# Patient Record
Sex: MALE | Race: WHITE | NOT HISPANIC OR LATINO | Employment: FULL TIME | URBAN - METROPOLITAN AREA
[De-identification: names, ages, dates, MRNs, and addresses within clinical notes are randomized per-mention and may not be internally consistent; named-entity substitution may affect disease eponyms.]

---

## 2023-10-30 ENCOUNTER — OFFICE VISIT (OUTPATIENT)
Dept: OTOLARYNGOLOGY | Facility: CLINIC | Age: 28
End: 2023-10-30
Payer: COMMERCIAL

## 2023-10-30 VITALS
DIASTOLIC BLOOD PRESSURE: 64 MMHG | HEIGHT: 63 IN | HEART RATE: 80 BPM | SYSTOLIC BLOOD PRESSURE: 110 MMHG | RESPIRATION RATE: 16 BRPM | TEMPERATURE: 98.2 F | WEIGHT: 115 LBS | BODY MASS INDEX: 20.38 KG/M2

## 2023-10-30 DIAGNOSIS — R09.81 NASAL CONGESTION: ICD-10-CM

## 2023-10-30 DIAGNOSIS — J35.8 TONSIL STONE: ICD-10-CM

## 2023-10-30 DIAGNOSIS — G47.30 SLEEP DISORDER BREATHING: ICD-10-CM

## 2023-10-30 DIAGNOSIS — J34.89 NASAL DRAINAGE: ICD-10-CM

## 2023-10-30 DIAGNOSIS — J35.01 CHRONIC TONSILLITIS: ICD-10-CM

## 2023-10-30 DIAGNOSIS — J34.89 NASAL OBSTRUCTION: ICD-10-CM

## 2023-10-30 DIAGNOSIS — J35.1 TONSILLAR HYPERTROPHY: ICD-10-CM

## 2023-10-30 DIAGNOSIS — R44.8 FACIAL PRESSURE: ICD-10-CM

## 2023-10-30 DIAGNOSIS — J31.0 CHRONIC RHINITIS: ICD-10-CM

## 2023-10-30 DIAGNOSIS — J34.3 HYPERTROPHY OF BOTH INFERIOR NASAL TURBINATES: ICD-10-CM

## 2023-10-30 DIAGNOSIS — J34.2 DEVIATED NASAL SEPTUM: ICD-10-CM

## 2023-10-30 DIAGNOSIS — J35.8 ASYMMETRIC TONSILS: Primary | ICD-10-CM

## 2023-10-30 PROBLEM — R45.851 SUICIDAL IDEATION: Status: ACTIVE | Noted: 2022-12-08

## 2023-10-30 PROCEDURE — 1036F TOBACCO NON-USER: CPT | Performed by: STUDENT IN AN ORGANIZED HEALTH CARE EDUCATION/TRAINING PROGRAM

## 2023-10-30 PROCEDURE — 99204 OFFICE O/P NEW MOD 45 MIN: CPT | Performed by: STUDENT IN AN ORGANIZED HEALTH CARE EDUCATION/TRAINING PROGRAM

## 2023-10-30 PROCEDURE — 31231 NASAL ENDOSCOPY DX: CPT | Performed by: STUDENT IN AN ORGANIZED HEALTH CARE EDUCATION/TRAINING PROGRAM

## 2023-10-30 RX ORDER — SOD CHLOR,BICARB/SQUEEZ BOTTLE
PACKET, WITH RINSE DEVICE NASAL
Qty: 1 EACH | Refills: 3 | Status: SHIPPED | OUTPATIENT
Start: 2023-10-30

## 2023-10-30 RX ORDER — ESCITALOPRAM OXALATE 10 MG/1
10 TABLET ORAL EVERY MORNING
COMMUNITY
Start: 2023-02-03 | End: 2023-11-22 | Stop reason: WASHOUT

## 2023-10-30 RX ORDER — FLUTICASONE PROPIONATE 50 MCG
2 SPRAY, SUSPENSION (ML) NASAL DAILY
Qty: 48 G | Refills: 3 | Status: SHIPPED | OUTPATIENT
Start: 2023-10-30 | End: 2024-10-29

## 2023-10-30 RX ORDER — CITALOPRAM 10 MG/1
20 TABLET ORAL EVERY MORNING
COMMUNITY
Start: 2023-09-02 | End: 2023-11-22 | Stop reason: WASHOUT

## 2023-10-30 RX ORDER — ALBUTEROL SULFATE 90 UG/1
1 AEROSOL, METERED RESPIRATORY (INHALATION)
COMMUNITY
Start: 2009-10-01

## 2023-10-30 NOTE — PROGRESS NOTES
HPI  Vlad Hollingsworth is a 27 y.o. male presenting for initial evaluation of multiple ENT complaints.  The patient reports developing an episode of chronic tonsillitis lasting approximately 6 months, this has now improved, nevertheless the patient endorses tonsillar asymmetry and recurrent episodes of tonsil stones.  Endorses snoring, poor night sleep and apneic events.   No known coagulopathies    In addition the patient reports multiple sinonasal issues  Main Symptoms: Daily sinonasal symptoms include bilateral nasal congestion/obstruction, anterior and posterior nasal drainage.  Endorses occasional facial pressure along the maxillary regions bilaterally.  Duration: Years  Laterality: Bilateral    Patient denies facial pain, ansomia, dental pain, watery/ itchy eyes, immunotherapy.    No odynophagia/dysphagia/SOB/dyspnea/hoarseness/fevers/chills/weight loss/night sweats/neck pain/neck lumps or bumps/hemoptysis.    Current treatment:  Nasal Steroid: No  Sinus Rinse: No  Antihistamine: No  Prednisone: No  Other: None    Recent CT: None    Previous nasal/sinus surgery: None       Past Medical History:   Diagnosis Date    Depression        History reviewed. No pertinent surgical history.      Current Outpatient Medications on File Prior to Visit   Medication Sig Dispense Refill    albuterol (ProAir HFA) 90 mcg/actuation inhaler Inhale 1 puff 4 times a day.      citalopram (CeleXA) 10 mg tablet Take 2 tablets (20 mg) by mouth once daily in the morning.      escitalopram (Lexapro) 10 mg tablet Take 1 tablet (10 mg) by mouth once daily in the morning.       No current facility-administered medications on file prior to visit.        Review of Systems  A detailed 12 point ROS was performed and is negative except as noted in the intake form, HPI and/or Past Medical History     Vitals:    10/30/23 1137   BP: 110/64   Pulse: 80   Resp: 16   Temp: 36.8 °C (98.2 °F)        Physical Exam   CONSTITUTIONAL: Vitals -reviewed from  intake field, well developed, well nourished.  VOICE: Normal voice quality  RESPIRATION: Breathing comfortably, no stridor.  CV: No clubbing/cyanosis/edema in hands.  EYES: EOM Intact, sclera normal.  NEURO: Alert and oriented times 3, Cranial nerves V,VII intact and symmetric bilaterally.  HEAD AND FACE: Symmetric facial features, no masses or lesions, sinuses nontender to palpation.  SALIVARY GLANDS: Parotid and submandibular glands normal bilaterally.  EARS: Normal external ears, external auditory canals, and TMs to otoscopy  + NOSE: External nose midline, anterior rhinoscopy is normal with limited visualization to the anterior aspect of the interior turbinates. No lesions noted.  Bilateral inferior turbinate hypertrophy  Left septal deviation right inferior spur  + ORAL CAVITY/OROPHARYNX/LIPS: Normal mucous membranes, normal floor of mouth/tongue/OP, no masses or lesions are noted.  Tonsils: Right 3+, left 4+, cryptic  PHARYNGEAL WALLS AND NASOPHARYNX: No masses noted. Mucosa appears clean and moist  NECK/LYMPH: No LAD, no thyroid masses. Trachea palpably midline  SKIN: Neck skin is without injury  PSYCH: Alert and oriented with appropriate mood and affect     Nasal endoscopy:  PROCEDURE NOTE    For better visualization because of septal deviation, turbinate hypertrophy nasal endoscopy was performed after verbal consent was obtained by the patient and/or guardian. Both nostrils were sprayed with a mixture of lidocaine 4% and Afrin. After a sufficient amount of time elapsed for mucosal anesthesia to take place, the nasal endoscope was advanced into the nostril.    The following areas were visualized:  Nasal passage, nasal septum, turbinates, middle meatus, nasopharynx, sinus ostia    The patient tolerated the procedure well and these structures were found to be normal except as follows:  Bilateral inferior turbinate hypertrophy  Left septal deviation right inferior spur  No mucopurulence, polyps, nor masses seen  in inferior meati, middle meati, nor sphenoethmoidal recesses bilaterally.     PROCEDURE NOTE:  FLEXIBLE NASOLARYNGOSCOPY     The risks, benefits, and alternatives were explained.  The patient wished to proceed and provided verbal consent.       INDICATIONS: To visualize anatomy in specific detail; evaluation of symptomatic disorder involving the voice, swallowing, or upper aerodigestive tract, including RADHA.      DESCRIPTION OF PROCEDURE: After adequate afrin and lidocaine spray, I advanced the endoscope into the nasal cavity.  The nasal cavity, nasopharynx, tongue base, vallecula, posterior/lateral pharyngeal walls, pyriform, epiglottis, post cricoid area, and larynx were within normal limits.  The following specific findings should be noted:  Small/nonobstructive adenoid pads  No lesions/masses  Mobile TVCs bilaterally  No pooling secretions     The patient tolerated the procedure without difficulty.     Assessment  Tonsillar asymmetry  Chronic tonsillitis  Tonsillar hypertrophy  Sleep disordered breathing  Chronic rhinitis  Nasal airway obstruction  Nasal septal deviation  Nasal drainage  Bilateral inferior turbinate hypertrophy    Plan  - Tonsillar asymmetry/hypertrophy, chronic tonsillitis  The patient and I discussed current symptoms as well as several therapeutic options.  The patient would like to proceed with tonsillectomy.  We reviewed the procedure, risks, benefits, limitations, expectations, and  recovery. Benefits were discussed include possibility of better breathing and sleep and less infections. Risks were discussed including: a 1 in 25 chance of bleeding, a 1 in 500 chance of transfusion, a 1 in 100,000 chance of life-threatening bleeding or death, anesthesia complications, infection, changes in taste, speech or swallowing. A full history and physical examination, informed consent, preoperative teaching, planning and arrangements have been performed.  All questions were answered.    - ORESTES STEIN   The  patient and I discussed their current nasal / sinus symptoms as well as several therapeutic options. I would like to begin a nasal hygiene/ medical regimen consisting of Flonase, saline irrigations.  The patient was instructed on the correct technique to administer the topical nasal spray (s) aiming at the lateral nasal wall for maximal efficacy and to avoid irritation to the septum which could lead to epistaxis. I stressed consistency of usage for maximal benefit. We discussed the rationale for the timing of this therapy and the benefits and potential adverse effects.      RTC 6w

## 2023-11-20 ENCOUNTER — LAB (OUTPATIENT)
Dept: LAB | Facility: LAB | Age: 28
End: 2023-11-20
Payer: COMMERCIAL

## 2023-11-20 ENCOUNTER — APPOINTMENT (OUTPATIENT)
Dept: LAB | Facility: LAB | Age: 28
End: 2023-11-20
Payer: COMMERCIAL

## 2023-11-20 DIAGNOSIS — J35.8 ASYMMETRIC TONSILS: ICD-10-CM

## 2023-11-20 DIAGNOSIS — J35.1 TONSILLAR HYPERTROPHY: ICD-10-CM

## 2023-11-20 LAB
APTT PPP: 35 SECONDS (ref 27–38)
ERYTHROCYTE [DISTWIDTH] IN BLOOD BY AUTOMATED COUNT: 12 % (ref 11.5–14.5)
HCT VFR BLD AUTO: 40.7 % (ref 41–52)
HGB BLD-MCNC: 13.7 G/DL (ref 13.5–17.5)
INR PPP: 1 (ref 0.9–1.1)
MCH RBC QN AUTO: 27.6 PG (ref 26–34)
MCHC RBC AUTO-ENTMCNC: 33.7 G/DL (ref 32–36)
MCV RBC AUTO: 82 FL (ref 80–100)
NRBC BLD-RTO: 0 /100 WBCS (ref 0–0)
PLATELET # BLD AUTO: 378 X10*3/UL (ref 150–450)
PROTHROMBIN TIME: 11.7 SECONDS (ref 9.8–12.8)
RBC # BLD AUTO: 4.96 X10*6/UL (ref 4.5–5.9)
WBC # BLD AUTO: 8 X10*3/UL (ref 4.4–11.3)

## 2023-11-20 PROCEDURE — 85610 PROTHROMBIN TIME: CPT

## 2023-11-20 PROCEDURE — 85027 COMPLETE CBC AUTOMATED: CPT

## 2023-11-20 PROCEDURE — 36415 COLL VENOUS BLD VENIPUNCTURE: CPT

## 2023-11-20 PROCEDURE — 85730 THROMBOPLASTIN TIME PARTIAL: CPT

## 2023-11-22 ENCOUNTER — OFFICE VISIT (OUTPATIENT)
Dept: OTOLARYNGOLOGY | Facility: CLINIC | Age: 28
End: 2023-11-22
Payer: COMMERCIAL

## 2023-11-22 VITALS — WEIGHT: 115 LBS | BODY MASS INDEX: 20.38 KG/M2 | HEIGHT: 63 IN

## 2023-11-22 DIAGNOSIS — J35.1 TONSILLAR HYPERTROPHY: ICD-10-CM

## 2023-11-22 DIAGNOSIS — J34.2 DEVIATED NASAL SEPTUM: ICD-10-CM

## 2023-11-22 DIAGNOSIS — R09.81 NASAL CONGESTION: ICD-10-CM

## 2023-11-22 DIAGNOSIS — G47.30 SLEEP DISORDER BREATHING: ICD-10-CM

## 2023-11-22 DIAGNOSIS — J35.8 ASYMMETRIC TONSILS: Primary | ICD-10-CM

## 2023-11-22 DIAGNOSIS — J31.0 CHRONIC RHINITIS: ICD-10-CM

## 2023-11-22 DIAGNOSIS — J35.8 TONSIL STONE: ICD-10-CM

## 2023-11-22 DIAGNOSIS — J34.3 HYPERTROPHY OF BOTH INFERIOR NASAL TURBINATES: ICD-10-CM

## 2023-11-22 DIAGNOSIS — J35.01 CHRONIC TONSILLITIS: ICD-10-CM

## 2023-11-22 PROCEDURE — 99213 OFFICE O/P EST LOW 20 MIN: CPT | Performed by: STUDENT IN AN ORGANIZED HEALTH CARE EDUCATION/TRAINING PROGRAM

## 2023-11-22 PROCEDURE — 1036F TOBACCO NON-USER: CPT | Performed by: STUDENT IN AN ORGANIZED HEALTH CARE EDUCATION/TRAINING PROGRAM

## 2023-11-22 RX ORDER — CITALOPRAM 20 MG/1
20 TABLET, FILM COATED ORAL
COMMUNITY
Start: 2023-10-23

## 2023-11-22 RX ORDER — ESCITALOPRAM OXALATE 5 MG/1
5 TABLET ORAL DAILY
COMMUNITY

## 2023-11-22 NOTE — PROGRESS NOTES
HPI  11/22/23  The patient presents for follow-up, he is here to discuss his upcoming surgery and clarify questions.  Recall   Vlad Hollingsworth is a 28 y.o. male presenting for initial evaluation of multiple ENT complaints.  The patient reports developing an episode of chronic tonsillitis lasting approximately 6 months, this has now improved, nevertheless the patient endorses tonsillar asymmetry and recurrent episodes of tonsil stones.  Endorses snoring, poor night sleep and apneic events.   No known coagulopathies    In addition the patient reports multiple sinonasal issues  Main Symptoms: Daily sinonasal symptoms include bilateral nasal congestion/obstruction, anterior and posterior nasal drainage.  Endorses occasional facial pressure along the maxillary regions bilaterally.  Duration: Years  Laterality: Bilateral    Patient denies facial pain, ansomia, dental pain, watery/ itchy eyes, immunotherapy.    No odynophagia/dysphagia/SOB/dyspnea/hoarseness/fevers/chills/weight loss/night sweats/neck pain/neck lumps or bumps/hemoptysis.    Current treatment:  Nasal Steroid: No  Sinus Rinse: No  Antihistamine: No  Prednisone: No  Other: None    Recent CT: None    Previous nasal/sinus surgery: None       Past Medical History:   Diagnosis Date    Depression        History reviewed. No pertinent surgical history.      Current Outpatient Medications on File Prior to Visit   Medication Sig Dispense Refill    albuterol (ProAir HFA) 90 mcg/actuation inhaler Inhale 1 puff 4 times a day.      citalopram (CeleXA) 20 mg tablet Take 1 tablet (20 mg) by mouth once daily in the morning. Take before meals.      escitalopram (Lexapro) 5 mg tablet Take 1 tablet (5 mg) by mouth once daily.      fluticasone (Flonase) 50 mcg/actuation nasal spray Administer 2 sprays into each nostril once daily. Shake gently. Before first use, prime pump. After use, clean tip and replace cap. 48 g 3    sod chloride-sodium bicarb (Neilmed Sinus Rinse  Complete) nasal rinse Irrigate your nose per instructions twice daily 1 each 3    [DISCONTINUED] citalopram (CeleXA) 10 mg tablet Take 2 tablets (20 mg) by mouth once daily in the morning.      [DISCONTINUED] escitalopram (Lexapro) 10 mg tablet Take 1 tablet (10 mg) by mouth once daily in the morning.       No current facility-administered medications on file prior to visit.        Review of Systems  A detailed 12 point ROS was performed and is negative except as noted in the intake form, HPI and/or Past Medical History     There were no vitals filed for this visit.       Physical Exam   CONSTITUTIONAL: Vitals -reviewed from intake field, well developed, well nourished.  VOICE: Normal voice quality  RESPIRATION: Breathing comfortably, no stridor.  CV: No clubbing/cyanosis/edema in hands.  EYES: EOM Intact, sclera normal.  NEURO: Alert and oriented times 3, Cranial nerves V,VII intact and symmetric bilaterally.  HEAD AND FACE: Symmetric facial features, no masses or lesions, sinuses nontender to palpation.  SALIVARY GLANDS: Parotid and submandibular glands normal bilaterally.  EARS: Normal external ears, external auditory canals, and TMs to otoscopy  + NOSE: External nose midline, anterior rhinoscopy is normal with limited visualization to the anterior aspect of the interior turbinates. No lesions noted.  Bilateral inferior turbinate hypertrophy  Left septal deviation right inferior spur  + ORAL CAVITY/OROPHARYNX/LIPS: Normal mucous membranes, normal floor of mouth/tongue/OP, no masses or lesions are noted.  Tonsils: Right 3+, left 4+, cryptic  PHARYNGEAL WALLS AND NASOPHARYNX: No masses noted. Mucosa appears clean and moist  NECK/LYMPH: No LAD, no thyroid masses. Trachea palpably midline  SKIN: Neck skin is without injury  PSYCH: Alert and oriented with appropriate mood and affect     Assessment  Tonsillar asymmetry  Chronic tonsillitis  Tonsillar hypertrophy  Sleep disordered breathing  Chronic rhinitis  Nasal  airway obstruction  Nasal septal deviation  Nasal drainage  Bilateral inferior turbinate hypertrophy    Plan  - Tonsillar asymmetry/hypertrophy, chronic tonsillitis  The patient and I discussed current symptoms as well as several therapeutic options.  The patient would like to proceed with tonsillectomy.  We reviewed the procedure, risks, benefits, limitations, expectations, and  recovery. Benefits were discussed include possibility of better breathing and sleep and less infections. Risks were discussed including: a 1 in 25 chance of bleeding, a 1 in 500 chance of transfusion, a 1 in 100,000 chance of life-threatening bleeding or death, anesthesia complications, infection, changes in taste, speech or swallowing. A full history and physical examination, informed consent, preoperative teaching, planning and arrangements have been performed.  All questions were answered.    - EMIL, ORESTES   The patient and I discussed their current nasal / sinus symptoms as well as several therapeutic options.  Sinonasal symptomatology is controlled.  He will continue his current nasal hygiene/ medical regimen consisting of Flonase, saline irrigations.  The patient was instructed on the correct technique to administer the topical nasal spray (s) aiming at the lateral nasal wall for maximal efficacy and to avoid irritation to the septum which could lead to epistaxis. I stressed consistency of usage for maximal benefit. We discussed the rationale for the timing of this therapy and the benefits and potential adverse effects.

## 2023-12-07 PROCEDURE — 88304 TISSUE EXAM BY PATHOLOGIST: CPT

## 2023-12-07 PROCEDURE — 0752T DGTZ GLS MCRSCP SLD LVL III: CPT

## 2023-12-07 PROCEDURE — 88304 TISSUE EXAM BY PATHOLOGIST: CPT | Performed by: PATHOLOGY

## 2023-12-08 ENCOUNTER — LAB REQUISITION (OUTPATIENT)
Dept: LAB | Facility: HOSPITAL | Age: 28
End: 2023-12-08
Payer: COMMERCIAL

## 2023-12-08 DIAGNOSIS — J35.8 OTHER CHRONIC DISEASES OF TONSILS AND ADENOIDS: ICD-10-CM

## 2023-12-19 LAB
LABORATORY COMMENT REPORT: NORMAL
PATH REPORT.FINAL DX SPEC: NORMAL
PATH REPORT.GROSS SPEC: NORMAL
PATH REPORT.RELEVANT HX SPEC: NORMAL
PATH REPORT.TOTAL CANCER: NORMAL

## 2024-01-03 ENCOUNTER — OFFICE VISIT (OUTPATIENT)
Dept: OTOLARYNGOLOGY | Facility: CLINIC | Age: 29
End: 2024-01-03
Payer: COMMERCIAL

## 2024-01-03 VITALS — HEIGHT: 63 IN | WEIGHT: 115 LBS | BODY MASS INDEX: 20.38 KG/M2

## 2024-01-03 DIAGNOSIS — J35.8 ASYMMETRIC TONSILS: ICD-10-CM

## 2024-01-03 DIAGNOSIS — J35.1 HYPERTROPHY OF TONSILS: ICD-10-CM

## 2024-01-03 DIAGNOSIS — J35.01 CHRONIC TONSILLITIS: Primary | ICD-10-CM

## 2024-01-03 PROBLEM — F41.9 ANXIETY: Status: ACTIVE | Noted: 2024-01-03

## 2024-01-03 PROCEDURE — 1036F TOBACCO NON-USER: CPT | Performed by: STUDENT IN AN ORGANIZED HEALTH CARE EDUCATION/TRAINING PROGRAM

## 2024-01-03 PROCEDURE — 99024 POSTOP FOLLOW-UP VISIT: CPT | Performed by: STUDENT IN AN ORGANIZED HEALTH CARE EDUCATION/TRAINING PROGRAM

## 2024-01-03 NOTE — PROGRESS NOTES
HPI  1/3/23  The patient present for follow-up status post tonsillectomy.  States he has been doing well.   Pathology results reviewed and discussed with the patient consistent with chronic tonsillitis.  Sinonasal symptomatology is well-controlled.  Recall   Vlad Hollingsworth is a 28 y.o. male presenting for initial evaluation of multiple ENT complaints.  The patient reports developing an episode of chronic tonsillitis lasting approximately 6 months, this has now improved, nevertheless the patient endorses tonsillar asymmetry and recurrent episodes of tonsil stones.  Endorses snoring, poor night sleep and apneic events.   No known coagulopathies    In addition the patient reports multiple sinonasal issues  Main Symptoms: Daily sinonasal symptoms include bilateral nasal congestion/obstruction, anterior and posterior nasal drainage.  Endorses occasional facial pressure along the maxillary regions bilaterally.  Duration: Years  Laterality: Bilateral    Patient denies facial pain, ansomia, dental pain, watery/ itchy eyes, immunotherapy.    No odynophagia/dysphagia/SOB/dyspnea/hoarseness/fevers/chills/weight loss/night sweats/neck pain/neck lumps or bumps/hemoptysis.    Current treatment:  Nasal Steroid: No  Sinus Rinse: No  Antihistamine: No  Prednisone: No  Other: None    Recent CT: None    Previous nasal/sinus surgery: None       Past Medical History:   Diagnosis Date    Depression        No past surgical history on file.      Current Outpatient Medications on File Prior to Visit   Medication Sig Dispense Refill    albuterol (ProAir HFA) 90 mcg/actuation inhaler Inhale 1 puff 4 times a day.      citalopram (CeleXA) 20 mg tablet Take 1 tablet (20 mg) by mouth once daily in the morning. Take before meals.      escitalopram (Lexapro) 5 mg tablet Take 1 tablet (5 mg) by mouth once daily.      fluticasone (Flonase) 50 mcg/actuation nasal spray Administer 2 sprays into each nostril once daily. Shake gently. Before first  use, prime pump. After use, clean tip and replace cap. 48 g 3    sod chloride-sodium bicarb (Neilmed Sinus Rinse Complete) nasal rinse Irrigate your nose per instructions twice daily 1 each 3     No current facility-administered medications on file prior to visit.        Review of Systems  A detailed 12 point ROS was performed and is negative except as noted in the intake form, HPI and/or Past Medical History     There were no vitals filed for this visit.       Physical Exam   CONSTITUTIONAL: Vitals -reviewed from intake field, well developed, well nourished.  VOICE: Normal voice quality  RESPIRATION: Breathing comfortably, no stridor.  CV: No clubbing/cyanosis/edema in hands.  EYES: EOM Intact, sclera normal.  NEURO: Alert and oriented times 3, Cranial nerves V,VII intact and symmetric bilaterally.  HEAD AND FACE: Symmetric facial features, no masses or lesions, sinuses nontender to palpation.  SALIVARY GLANDS: Parotid and submandibular glands normal bilaterally.  EARS: Normal external ears, external auditory canals, and TMs to otoscopy  + NOSE: External nose midline, anterior rhinoscopy is normal with limited visualization to the anterior aspect of the interior turbinates. No lesions noted.  Bilateral inferior turbinate hypertrophy  Left septal deviation right anterior-inferior spur  + ORAL CAVITY/OROPHARYNX/LIPS: Normal mucous membranes, normal floor of mouth/tongue/OP, no masses or lesions are noted.  Tonsillar fossas healing well.    PHARYNGEAL WALLS AND NASOPHARYNX: No masses noted. Mucosa appears clean and moist  NECK/LYMPH: No LAD, no thyroid masses. Trachea palpably midline  SKIN: Neck skin is without injury  PSYCH: Alert and oriented with appropriate mood and affect     Assessment  Tonsillar asymmetry, chronic tonsillitis s/p tonsillectomy   Chronic rhinitis  Nasal airway obstruction  Nasal septal deviation  Nasal drainage  Bilateral inferior turbinate hypertrophy    Plan  - Tonsillar  asymmetry/hypertrophy, chronic tonsillitis  Healing well from surgery.     - CR, ORESTES   The patient and I discussed their current nasal / sinus symptoms as well as several therapeutic options.  Sinonasal symptomatology is controlled.  He will continue his current nasal hygiene/ medical regimen consisting of Flonase, saline irrigations.  The patient was instructed on the correct technique to administer the topical nasal spray (s) aiming at the lateral nasal wall for maximal efficacy and to avoid irritation to the septum which could lead to epistaxis. I stressed consistency of usage for maximal benefit. We discussed the rationale for the timing of this therapy and the benefits and potential adverse effects.      RTC 6m

## 2024-02-07 ENCOUNTER — APPOINTMENT (OUTPATIENT)
Dept: OTOLARYNGOLOGY | Facility: CLINIC | Age: 29
End: 2024-02-07
Payer: COMMERCIAL

## 2024-05-15 ENCOUNTER — TELEPHONE (OUTPATIENT)
Dept: OBSTETRICS AND GYNECOLOGY | Facility: CLINIC | Age: 29
End: 2024-05-15
Payer: COMMERCIAL

## 2024-05-15 DIAGNOSIS — N46.9 MALE INFERTILITY: Primary | ICD-10-CM

## 2024-07-03 ENCOUNTER — APPOINTMENT (OUTPATIENT)
Dept: OTOLARYNGOLOGY | Facility: CLINIC | Age: 29
End: 2024-07-03
Payer: COMMERCIAL

## 2024-07-03 VITALS — BODY MASS INDEX: 20.38 KG/M2 | HEIGHT: 63 IN | WEIGHT: 115 LBS

## 2024-07-03 DIAGNOSIS — J34.2 DEVIATED NASAL SEPTUM: ICD-10-CM

## 2024-07-03 DIAGNOSIS — J35.8 ASYMMETRIC TONSILS: Primary | ICD-10-CM

## 2024-07-03 DIAGNOSIS — J31.0 CHRONIC RHINITIS: ICD-10-CM

## 2024-07-03 DIAGNOSIS — J35.1 HYPERTROPHY OF TONSILS: ICD-10-CM

## 2024-07-03 DIAGNOSIS — J34.3 HYPERTROPHY OF BOTH INFERIOR NASAL TURBINATES: ICD-10-CM

## 2024-07-03 DIAGNOSIS — R09.81 NASAL CONGESTION: ICD-10-CM

## 2024-07-03 DIAGNOSIS — J35.01 CHRONIC TONSILLITIS: ICD-10-CM

## 2024-07-03 PROCEDURE — 99213 OFFICE O/P EST LOW 20 MIN: CPT | Performed by: STUDENT IN AN ORGANIZED HEALTH CARE EDUCATION/TRAINING PROGRAM

## 2024-07-03 PROCEDURE — 1036F TOBACCO NON-USER: CPT | Performed by: STUDENT IN AN ORGANIZED HEALTH CARE EDUCATION/TRAINING PROGRAM

## 2024-07-03 NOTE — PROGRESS NOTES
HPI  7/2/24  The patient present for follow-up, state he has been doing well.  No oropharyngeal or sinonasal complaints.  Stopped using Flonase.   1/3/23  The patient present for follow-up status post tonsillectomy.  States he has been doing well.   Pathology results reviewed and discussed with the patient consistent with chronic tonsillitis.  Sinonasal symptomatology is well-controlled.  Recall   Vlad Hollingsworth is a 28 y.o. male presenting for initial evaluation of multiple ENT complaints.  The patient reports developing an episode of chronic tonsillitis lasting approximately 6 months, this has now improved, nevertheless the patient endorses tonsillar asymmetry and recurrent episodes of tonsil stones.  Endorses snoring, poor night sleep and apneic events.   No known coagulopathies    In addition the patient reports multiple sinonasal issues  Main Symptoms: Daily sinonasal symptoms include bilateral nasal congestion/obstruction, anterior and posterior nasal drainage.  Endorses occasional facial pressure along the maxillary regions bilaterally.  Duration: Years  Laterality: Bilateral    Patient denies facial pain, ansomia, dental pain, watery/ itchy eyes, immunotherapy.    No odynophagia/dysphagia/SOB/dyspnea/hoarseness/fevers/chills/weight loss/night sweats/neck pain/neck lumps or bumps/hemoptysis.    Current treatment:  Nasal Steroid: No  Sinus Rinse: No  Antihistamine: No  Prednisone: No  Other: None    Recent CT: None    Previous nasal/sinus surgery: None       Past Medical History:   Diagnosis Date    Depression        Past Surgical History:   Procedure Laterality Date    TONSILLECTOMY           Current Outpatient Medications on File Prior to Visit   Medication Sig Dispense Refill    citalopram (CeleXA) 20 mg tablet Take 1 tablet (20 mg) by mouth once daily in the morning. Take before meals.      fluticasone (Flonase) 50 mcg/actuation nasal spray Administer 2 sprays into each nostril once daily. Shake gently.  Before first use, prime pump. After use, clean tip and replace cap. 48 g 3    sod chloride-sodium bicarb (Neilmed Sinus Rinse Complete) nasal rinse Irrigate your nose per instructions twice daily 1 each 3    [DISCONTINUED] albuterol (ProAir HFA) 90 mcg/actuation inhaler Inhale 1 puff 4 times a day.      [DISCONTINUED] escitalopram (Lexapro) 5 mg tablet Take 1 tablet (5 mg) by mouth once daily.       No current facility-administered medications on file prior to visit.        Review of Systems  A detailed 12 point ROS was performed and is negative except as noted in the intake form, HPI and/or Past Medical History     There were no vitals filed for this visit.       Physical Exam   CONSTITUTIONAL: Well developed, well nourished.  VOICE: Normal voice quality  RESPIRATION: Breathing comfortably, no stridor.  CV: No clubbing/cyanosis/edema in hands.  EYES: EOM Intact, sclera normal.  NEURO: Alert and oriented times 3, Cranial nerves V,VII intact and symmetric bilaterally.  HEAD AND FACE: Symmetric facial features, no masses or lesions, sinuses nontender to palpation.  SALIVARY GLANDS: Parotid and submandibular glands normal bilaterally.  EARS: Normal external ears, external auditory canals, and TMs to otoscopy  + NOSE: External nose midline, anterior rhinoscopy is normal with limited visualization to the anterior aspect of the interior turbinates. No lesions noted.  Bilateral inferior turbinate hypertrophy  Right anterior-inferior deviation  + ORAL CAVITY/OROPHARYNX/LIPS: Normal mucous membranes, normal floor of mouth/tongue/OP, no masses or lesions are noted.  Tonsillar fossas healing well.    PHARYNGEAL WALLS AND NASOPHARYNX: No masses noted. Mucosa appears clean and moist  NECK/LYMPH: No LAD, no thyroid masses. Trachea palpably midline  SKIN: Neck skin is without injury  PSYCH: Alert and oriented with appropriate mood and affect     Assessment  Tonsillar asymmetry, chronic tonsillitis s/p tonsillectomy   Chronic  rhinitis  Nasal airway obstruction  Nasal septal deviation  Bilateral inferior turbinate hypertrophy    Plan  - Tonsillar asymmetry/hypertrophy, chronic tonsillitis  S/p tonsillectomy, well-healed     - CR, ORESTES   The patient and I discussed their current nasal / sinus symptoms as well as several therapeutic options.  Sinonasal symptomatology is controlled.    Has stopped using medicated nasal sprays, he will resume Flonase if sinonasal symptomatology worsens.  Continue saline irrigations    RTC PRN

## 2024-11-18 ENCOUNTER — CONSULT (OUTPATIENT)
Dept: ENDOCRINOLOGY | Facility: CLINIC | Age: 29
End: 2024-11-18
Payer: COMMERCIAL

## 2024-11-18 ENCOUNTER — LAB (OUTPATIENT)
Dept: LAB | Facility: LAB | Age: 29
End: 2024-11-18
Payer: COMMERCIAL

## 2024-11-18 VITALS
HEIGHT: 63 IN | WEIGHT: 123.25 LBS | BODY MASS INDEX: 21.84 KG/M2 | DIASTOLIC BLOOD PRESSURE: 70 MMHG | HEART RATE: 85 BPM | SYSTOLIC BLOOD PRESSURE: 108 MMHG

## 2024-11-18 DIAGNOSIS — Z11.3 ENCOUNTER FOR SCREENING EXAMINATION FOR SEXUALLY TRANSMITTED DISEASE: ICD-10-CM

## 2024-11-18 DIAGNOSIS — Z31.41 FERTILITY TESTING: Primary | ICD-10-CM

## 2024-11-18 LAB
HBV SURFACE AG SERPL QL IA: NONREACTIVE
HCV AB SER QL: NONREACTIVE

## 2024-11-18 PROCEDURE — 36415 COLL VENOUS BLD VENIPUNCTURE: CPT

## 2024-11-18 PROCEDURE — 86803 HEPATITIS C AB TEST: CPT

## 2024-11-18 PROCEDURE — 99202 OFFICE O/P NEW SF 15 MIN: CPT

## 2024-11-18 PROCEDURE — 87340 HEPATITIS B SURFACE AG IA: CPT

## 2024-11-18 PROCEDURE — 86780 TREPONEMA PALLIDUM: CPT

## 2024-11-18 PROCEDURE — 99212 OFFICE O/P EST SF 10 MIN: CPT

## 2024-11-18 PROCEDURE — 87389 HIV-1 AG W/HIV-1&-2 AB AG IA: CPT

## 2024-11-18 RX ORDER — ARIPIPRAZOLE 2 MG/1
2 TABLET ORAL
COMMUNITY
Start: 2024-10-29

## 2024-11-18 ASSESSMENT — PATIENT HEALTH QUESTIONNAIRE - PHQ9
2. FEELING DOWN, DEPRESSED OR HOPELESS: NOT AT ALL
1. LITTLE INTEREST OR PLEASURE IN DOING THINGS: NOT AT ALL
SUM OF ALL RESPONSES TO PHQ9 QUESTIONS 1 AND 2: 0

## 2024-11-18 ASSESSMENT — COLUMBIA-SUICIDE SEVERITY RATING SCALE - C-SSRS
6. HAVE YOU EVER DONE ANYTHING, STARTED TO DO ANYTHING, OR PREPARED TO DO ANYTHING TO END YOUR LIFE?: NO
1. IN THE PAST MONTH, HAVE YOU WISHED YOU WERE DEAD OR WISHED YOU COULD GO TO SLEEP AND NOT WAKE UP?: NO
2. HAVE YOU ACTUALLY HAD ANY THOUGHTS OF KILLING YOURSELF?: NO

## 2024-11-18 ASSESSMENT — PAIN SCALES - GENERAL: PAINLEVEL_OUTOF10: 0-NO PAIN

## 2024-11-18 NOTE — PROGRESS NOTES
Visit Type: In Person    Patient is the partner of Maribeth Pearson,  1999, who presents to [facility] care to order fertility testing and treatment, h/o 2 abnormal at home SAs    PARTNER HISTORY  Partner Name: Vlad Hollingsworth  Partner : [birth year]/10/[id number]  Partner email: [email or web address]  Occupation:   Prior fertility history: unknown  PMH: n/a  PSH: N/A  Smoking:No  Alcohol Use: Yes  Drug Use: No  Medications: 40mg citalopram, 1mg aripiprazole, male fertility one day male preconception health- doses not high enough for recommendation   Injuries: No  STD: No  SA: 2 at home SA- abnormal per pt  SA Results: No    INSTRUCTIONS FOR INFERTILITY TESTING    Semen Analysis  The semen samples that you have been asked to submit are important for diagnostic and therapeutic purposes.  Please abstain from ejaculating 2-3 days prior to collecting the sample.  The sample should be produced by masturbation.  You will need to collect the ejaculate into the container supplied by the [facility] (you can get containers at your doctor's office).  Do not use other plastic containers from home such as Tupperware as these containers as they may interfere with the test results.  Lubricants and saliva also interfere with test results.  If a collection condom is necessary, please request one at the Andrology lab and they will provide you with an appropriate collection condom.  It is extremely important that the entire sample is collected in the container as the first few drops of ejaculate contain a major portion of sperm.  If you do not collect the entire specimen, please inform the technologist.      You can collect at home as long as you can get the sample to the Andrology lab within 1 hour of collecting.  Please alvino your name and time of collection on the container. You should carry the container close to your body.  Collection rooms are available at all locations as well.     An appointment is needed  to ensure the lab has enough time to process your specimen.  Please call [phone] to schedule this appointment.       Plan:  SA  STDs  Myriad if partner is a carrier   Start Recommend Male Vitamins: Co-Q10   200 mg daily, L-Carnitine 200-500 mg daily, Vitamin C 500 mg daily    Follow up Plan:  6-8 week virtual visit with partner to review test results and treatment options    Lindsay Pastor CNP 11/18/24 3:16 PM

## 2024-11-19 LAB
HIV 1+2 AB+HIV1 P24 AG SERPL QL IA: NONREACTIVE
TREPONEMA PALLIDUM IGG+IGM AB [PRESENCE] IN SERUM OR PLASMA BY IMMUNOASSAY: NONREACTIVE

## 2024-11-20 DIAGNOSIS — Z11.3 ENCOUNTER FOR SCREENING EXAMINATION FOR SEXUALLY TRANSMITTED DISEASE: Primary | ICD-10-CM

## 2024-12-06 ENCOUNTER — ANCILLARY PROCEDURE (OUTPATIENT)
Dept: ENDOCRINOLOGY | Facility: CLINIC | Age: 29
End: 2024-12-06
Payer: COMMERCIAL

## 2024-12-06 DIAGNOSIS — Z31.41 FERTILITY TESTING: ICD-10-CM

## 2024-12-06 LAB
% EX RESIDUAL CYTOPLASM (SEMEN): 0 %
% HEAD DEFECTS (SEMEN): 96.5 %
% NECK MIDPIECE (SEMEN): 20.8 %
% NORMAL (SEMEN): 3.5 % (ref 4–?)
% TAIL DEFECTS (SEMEN): 11.5 %
ABSTINENCE (DAYS): 3 DAYS (ref 2–7)
AGGLUTINATION (SEMEN): NO
AMOUNT MISSED:: ABNORMAL
ANALYZED TIME:: ABNORMAL
ANDROLOGY LAB ID#: ABNORMAL
CLUMPS (SEMEN): YES
COLLECTED COMPLETELY: NO
COLLECTION LOCATION:: ABNORMAL
COLLECTION METHOD:: ABNORMAL
CONCENTRATION (URINE): ABNORMAL
CONCENTRATION CN (POST-WASH): ABNORMAL
CONCENTRATION(SEMEN): 101.32 MILL/ML (ref 15–?)
DEBRIS (SEMEN): YES
DEGREE (SEMEN): ABNORMAL
LEUKOCYTE (SEMEN): ABNORMAL
NON PROG. MOTILITY (SEMEN): 3 %
NON PROG. MOTILITY (URINE): ABNORMAL
NON PROG. MOTILITY CN (POST-WASH): ABNORMAL
PATTERN (SEMEN): ABNORMAL
PORTION MISSED REI: ABNORMAL
PROG. MOTILITY (SEMEN): 51 % (ref 32–?)
PROG. MOTILITY (URINE): ABNORMAL
PROG. MOTILITY CN (POST-WASH): ABNORMAL
RECEIVED TIME:: ABNORMAL
REI PARTNER DOB: ABNORMAL
REI PARTNER NAME: ABNORMAL
SEMEN APPEARANCE: NORMAL
SEMEN LIQUEFACTION: NORMAL
SEMEN VISCOSITY: NORMAL
TOT. NO OF NORM. MOTILE SPERM (SEMEN): 1.91 MILL
TOT. NO OF NORM. SPERM (SEMEN): 3.55 MILL
TOTAL MOTILITY (SEMEN): 54 % (ref 40–?)
TOTAL MOTILITY (URINE): ABNORMAL
TOTAL MOTILITY CN (POST-WASH): ABNORMAL
TOTAL NO OF MOTILE (SEMEN): 54.7 MILL
TOTAL NO OF MOTILE (URINE): ABNORMAL
TOTAL NO OF MOTILE CN (POST-WASH): ABNORMAL
TOTAL NO OF RND CELLS (SEMEN): 0.5 MILL (ref ?–5)
TOTAL NO OF RND CELLS (URINE): ABNORMAL
TOTAL NO OF SPERM (SEMEN): 101.32 MILL (ref 39–?)
TOTAL NO OF SPERM (URINE): ABNORMAL
TOTAL NO OF SPERM CN (POST-WASH): ABNORMAL
VOLUME (SEMEN): 1 ML (ref 1.5–?)
VOLUME CN (POST-WASH): ABNORMAL
VOLUME OF URINE: ABNORMAL

## 2024-12-06 PROCEDURE — 89322 SEMEN ANAL STRICT CRITERIA: CPT | Performed by: OBSTETRICS & GYNECOLOGY

## 2024-12-11 DIAGNOSIS — Z13.71 SCREENING FOR GENETIC DISEASE CARRIER STATUS: Primary | ICD-10-CM

## 2025-02-16 DIAGNOSIS — Z13.71 SCREENING FOR GENETIC DISEASE CARRIER STATUS: Primary | ICD-10-CM

## 2025-02-16 DIAGNOSIS — Z11.3 ENCOUNTER FOR SCREENING EXAMINATION FOR SEXUALLY TRANSMITTED DISEASE: Primary | ICD-10-CM

## 2025-02-25 ENCOUNTER — LAB (OUTPATIENT)
Dept: LAB | Facility: HOSPITAL | Age: 30
End: 2025-02-25
Payer: COMMERCIAL

## 2025-02-25 DIAGNOSIS — Z13.71 ENCOUNTER FOR NONPROCREATIVE SCREENING FOR GENETIC DISEASE CARRIER STATUS: Primary | ICD-10-CM

## 2025-02-25 LAB
C TRACH RRNA SPEC QL NAA+PROBE: NOT DETECTED
N GONORRHOEA RRNA SPEC QL NAA+PROBE: NOT DETECTED
QUEST GC CT AMPLIFIED (ALWAYS MESSAGE): NORMAL

## 2025-03-13 LAB
COMMENTS - MP RESULT TYPE: NORMAL
SCAN RESULT: NORMAL